# Patient Record
Sex: FEMALE
[De-identification: names, ages, dates, MRNs, and addresses within clinical notes are randomized per-mention and may not be internally consistent; named-entity substitution may affect disease eponyms.]

---

## 2020-11-10 ENCOUNTER — RESULT REVIEW (OUTPATIENT)
Age: 30
End: 2020-11-10

## 2021-05-06 PROBLEM — Z00.00 ENCOUNTER FOR PREVENTIVE HEALTH EXAMINATION: Status: ACTIVE | Noted: 2021-05-06

## 2021-05-10 ENCOUNTER — APPOINTMENT (OUTPATIENT)
Dept: HEMATOLOGY ONCOLOGY | Facility: CLINIC | Age: 31
End: 2021-05-10
Payer: COMMERCIAL

## 2021-05-10 DIAGNOSIS — R79.89 OTHER SPECIFIED ABNORMAL FINDINGS OF BLOOD CHEMISTRY: ICD-10-CM

## 2021-05-10 PROCEDURE — 99203 OFFICE O/P NEW LOW 30 MIN: CPT | Mod: 25,95

## 2021-05-10 RX ORDER — AMOXICILLIN 500 MG/1
500 CAPSULE ORAL
Qty: 14 | Refills: 0 | Status: ACTIVE | COMMUNITY
Start: 2021-01-03

## 2021-05-10 RX ORDER — CIPROFLOXACIN HYDROCHLORIDE 500 MG/1
500 TABLET, FILM COATED ORAL
Qty: 20 | Refills: 0 | Status: ACTIVE | COMMUNITY
Start: 2021-03-21

## 2021-05-10 RX ORDER — FLUCONAZOLE 150 MG/1
150 TABLET ORAL
Qty: 2 | Refills: 0 | Status: ACTIVE | COMMUNITY
Start: 2021-01-03

## 2021-05-10 NOTE — HISTORY OF PRESENT ILLNESS
[de-identified] : 30 years old female no significant past medical history, coming the other night with severe chest pain went to the nearest hospital emergency room, available in order out for a PE but was found to have elevated LFTs... Denies EtOH intake... States she had a gastroenteritis the week before...

## 2021-05-10 NOTE — ASSESSMENT
[FreeTextEntry1] : Discussed with patient her elevated LFTs...\par \par Recommended she sees a PCP and has the LFTs repeated...\par \par \par Follow-up here as needed.